# Patient Record
Sex: MALE | Race: OTHER | HISPANIC OR LATINO
[De-identification: names, ages, dates, MRNs, and addresses within clinical notes are randomized per-mention and may not be internally consistent; named-entity substitution may affect disease eponyms.]

---
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
## 2020-01-12 ENCOUNTER — TRANSCRIPTION ENCOUNTER (OUTPATIENT)
Age: 20
End: 2020-01-12

## 2020-07-06 ENCOUNTER — TRANSCRIPTION ENCOUNTER (OUTPATIENT)
Age: 20
End: 2020-07-06

## 2021-05-19 NOTE — ED PROVIDER NOTE - MDM ORDERS SUBMITTED SELECTION
"Chief Complaint   Patient presents with     RECHECK     Follow up [facility]  Right Foot     Wound Check     Right Foot         Initial /52 (BP Location: Left arm, Patient Position: Sitting, Cuff Size: Adult Large)   Pulse 61   Temp 97.9  F (36.6  C) (Tympanic)   Wt 97.1 kg (214 lb)   SpO2 98%   BMI 31.60 kg/m   Estimated body mass index is 31.6 kg/m  as calculated from the following:    Height as of 2/17/21: 1.753 m (5' 9\").    Weight as of this encounter: 97.1 kg (214 lb).  Medication Reconciliation: complete  Tamica Baltazar LPN  "
Medications/Imaging Studies/Labs

## 2021-05-31 ENCOUNTER — EMERGENCY (EMERGENCY)
Facility: HOSPITAL | Age: 21
LOS: 0 days | Discharge: HOME | End: 2021-05-31
Attending: HOSPITALIST
Payer: COMMERCIAL

## 2021-05-31 VITALS — HEIGHT: 71 IN | WEIGHT: 235.01 LBS

## 2021-05-31 VITALS
SYSTOLIC BLOOD PRESSURE: 124 MMHG | RESPIRATION RATE: 18 BRPM | TEMPERATURE: 98 F | HEART RATE: 99 BPM | OXYGEN SATURATION: 100 % | DIASTOLIC BLOOD PRESSURE: 74 MMHG

## 2021-05-31 DIAGNOSIS — L02.01 CUTANEOUS ABSCESS OF FACE: ICD-10-CM

## 2021-05-31 PROCEDURE — 99283 EMERGENCY DEPT VISIT LOW MDM: CPT | Mod: 25

## 2021-05-31 PROCEDURE — 10060 I&D ABSCESS SIMPLE/SINGLE: CPT

## 2021-05-31 RX ORDER — CEPHALEXIN 500 MG
1 CAPSULE ORAL
Qty: 28 | Refills: 0
Start: 2021-05-31 | End: 2021-06-06

## 2021-05-31 RX ORDER — CEPHALEXIN 500 MG
500 CAPSULE ORAL ONCE
Refills: 0 | Status: COMPLETED | OUTPATIENT
Start: 2021-05-31 | End: 2021-05-31

## 2021-05-31 RX ADMIN — Medication 500 MILLIGRAM(S): at 23:15

## 2021-05-31 NOTE — ED ADULT TRIAGE NOTE - CHIEF COMPLAINT QUOTE
abscess under left eye worsening for past couple of days, increasing swelling and redness. pain when closing eye. no vision changes.

## 2021-05-31 NOTE — ED STATDOCS - OBJECTIVE STATEMENT
19 y/o male with no pertinent PMHx presents to the ED c/o right cheek abscess since yesterday. Pt reports large pimple to right upper cheek, mildly painful. Pt reports today drainage from right eye and increased swelling to area. Pt reports swelling tracking under right eyelid. Pt reports pain when looking down. Denies visual changes or fevers.

## 2021-05-31 NOTE — ED STATDOCS - NEUROLOGICAL, MLM
LV 3/27/2019 for ance and Accutane recommended by Dr Jolly. Urine preg testing neg on 3/29/2019. Routing current results to Dr Valladares. Also see lipid panel results. Okay to order Accutane 40 mg daily per Dr Jolly office note on 3/27/2019? Elena Barrientos RN,BSN 04/29/19 3:48 PM    
Patient notified of lab results and follow-up plan.  Patient confirmed in [employer] as ok'd per Dr. Valladares.     Electronically Signed by:    Myra Reed CMA  04/30/19  
sensation is normal and strength is normal.

## 2021-05-31 NOTE — ED STATDOCS - NSFOLLOWUPINSTRUCTIONS_ED_ALL_ED_FT
TAKE ANTIBIOTICS TO COMPLETION. FOLLOW UP WITH PLASTICS IN NEXT 24-48 HOURS. CALL IN AM FOR APPOINTMENT.     Skin Abscess  ImageA skin abscess is an infected area on or under your skin that contains pus and other material. An abscess can happen almost anywhere on your body. Some abscesses break open (rupture) on their own. Most continue to get worse unless they are treated. The infection can spread deeper into the body and into your blood, which can make you feel sick. Treatment usually involves draining the abscess.    Follow these instructions at home:  Abscess Care     If you have an abscess that has not drained, place a warm, clean, wet washcloth over the abscess several times a day. Do this as told by your doctor.  Follow instructions from your doctor about how to take care of your abscess. Make sure you:    Cover the abscess with a bandage (dressing).  Change your bandage or gauze as told by your doctor.  Wash your hands with soap and water before you change the bandage or gauze. If you cannot use soap and water, use hand .    Check your abscess every day for signs that the infection is getting worse. Check for:    More redness, swelling, or pain.  More fluid or blood.  Warmth.  More pus or a bad smell.    Medicines     Image   Take over-the-counter and prescription medicines only as told by your doctor.  If you were prescribed an antibiotic medicine, take it as told by your doctor. Do not stop taking the antibiotic even if you start to feel better.  General instructions     To avoid spreading the infection:    Do not share personal care items, towels, or hot tubs with others.  Avoid making skin-to-skin contact with other people.    Keep all follow-up visits as told by your doctor. This is important.  Contact a doctor if:  You have more redness, swelling, or pain around your abscess.  You have more fluid or blood coming from your abscess.  Your abscess feels warm when you touch it.  You have more pus or a bad smell coming from your abscess.  You have a fever.  Your muscles ache.  You have chills.  You feel sick.  Get help right away if:  You have very bad (severe) pain.  You see red streaks on your skin spreading away from the abscess.

## 2021-05-31 NOTE — ED STATDOCS - EYES, MLM
clear bilaterally.  Pupils equal, round, and reactive to light. +Fluctuant fluid collection to right upper cheek extending to under right eyelid. EOMI

## 2021-05-31 NOTE — ED STATDOCS - PATIENT PORTAL LINK FT
You can access the FollowMyHealth Patient Portal offered by [facility] by registering at the following website: [email or web address]. By joining [employer]’s FollowMyHealth portal, you will also be able to view your health information using other applications (apps) compatible with our system.

## 2021-05-31 NOTE — ED STATDOCS - CARE PROVIDER_API CALL
Kristen Berrios)  Plastic Surgery; Surgery  [address] [facility]  [address] [place] [address]  Phone: [phone]  Fax: [phone]  Follow Up Time:

## 2021-05-31 NOTE — ED STATDOCS - NS_ ATTENDINGSCRIBEDETAILS _ED_A_ED_FT
Jennifer Nina MD: The history, relevant review of systems, past medical and surgical history, medical decision making, and physical examination was documented by the scribe in my presence and I attest to the accuracy of the documentation.

## 2021-05-31 NOTE — ED STATDOCS - PROGRESS NOTE DETAILS
21 y/o M with no PMH presents with right facial abscess which started yesterday. Pt states area was thought to be a pimple but became more swollen 21 y/o M with no PMH presents with right facial abscess which started yesterday. Pt states area was thought to be a pimple but became more swollen. Reports mild drainage today. Denies fever, chills, nausea, vomiting. PE: Well appearing. +fluctuant, edematous area under right eye. No active drainage. Scant dried blood at base of edematous area. PERRLA, EOMI. Nares patent. A/P: Abscess to face, will d/w plastics regarding I&D as patient is requesting plastics. LIkely dc home with PO antibiotics. - Chau Shepard PA-C D/w Dr Berrios, advised I&D in ED, will see patient in office for follow up. - Chau Shepard PA-C

## 2021-06-01 PROBLEM — Z78.9 OTHER SPECIFIED HEALTH STATUS: Chronic | Status: ACTIVE | Noted: 2019-11-12
